# Patient Record
Sex: FEMALE
[De-identification: names, ages, dates, MRNs, and addresses within clinical notes are randomized per-mention and may not be internally consistent; named-entity substitution may affect disease eponyms.]

---

## 2022-12-21 ENCOUNTER — RX ONLY (OUTPATIENT)
Age: 22
Setting detail: RX ONLY
End: 2022-12-21

## 2022-12-21 RX ORDER — BIMATOPROST 0.3 MG/ML
SOLUTION/ DROPS OPHTHALMIC
Qty: 5 | Refills: 11 | Status: ERX | COMMUNITY
Start: 2022-12-21

## 2023-09-14 ENCOUNTER — RX ONLY (OUTPATIENT)
Age: 23
Setting detail: RX ONLY
End: 2023-09-14

## 2023-09-14 RX ORDER — CLOBETASOL PROPIONATE 0.5 MG/G
OINTMENT TOPICAL
Qty: 60 | Refills: 1 | Status: ERX | COMMUNITY
Start: 2023-09-13

## 2024-11-19 ENCOUNTER — RX ONLY (OUTPATIENT)
Age: 24
Setting detail: RX ONLY
End: 2024-11-19

## 2024-11-19 RX ORDER — CLINDAMYCIN PHOSPHATE 10 MG/ML
SOLUTION TOPICAL
Qty: 60 | Refills: 8 | Status: ERX | COMMUNITY
Start: 2024-11-19

## 2024-12-03 ENCOUNTER — RX ONLY (RX ONLY)
Age: 24
End: 2024-12-03

## 2024-12-03 RX ORDER — ROFLUMILAST 1.5 MG/G
CREAM TOPICAL
Qty: 60 | Refills: 3 | Status: ERX | COMMUNITY
Start: 2024-12-03

## 2024-12-30 ENCOUNTER — RX ONLY (RX ONLY)
Age: 24
End: 2024-12-30

## 2024-12-30 RX ORDER — PREDNISONE 10 MG/1
TABLET ORAL
Qty: 53 | Refills: 0 | Status: ERX | COMMUNITY
Start: 2024-12-29

## 2025-02-03 ENCOUNTER — APPOINTMENT (OUTPATIENT)
Dept: URBAN - METROPOLITAN AREA CLINIC 310 | Facility: CLINIC | Age: 25
Setting detail: DERMATOLOGY
End: 2025-02-03

## 2025-02-03 DIAGNOSIS — L20.89 OTHER ATOPIC DERMATITIS: ICD-10-CM

## 2025-02-03 PROCEDURE — ? OTC TREATMENT REGIMEN

## 2025-02-03 PROCEDURE — ? PRESCRIPTION MEDICATION MANAGEMENT

## 2025-02-03 PROCEDURE — ? MDM - TREATMENT GOALS

## 2025-02-03 PROCEDURE — ? ORDER FOR PATCH TESTING

## 2025-02-03 PROCEDURE — 99204 OFFICE O/P NEW MOD 45 MIN: CPT

## 2025-02-03 PROCEDURE — ? PRESCRIPTION

## 2025-02-03 PROCEDURE — ? COUNSELING

## 2025-02-03 RX ORDER — TRIAMCINOLONE ACETONIDE 1 MG/G
CREAM TOPICAL BID
Qty: 453.6 | Refills: 0 | Status: ERX | COMMUNITY
Start: 2025-02-03

## 2025-02-03 RX ADMIN — TRIAMCINOLONE ACETONIDE: 1 CREAM TOPICAL at 00:00

## 2025-02-03 ASSESSMENT — LOCATION DETAILED DESCRIPTION DERM
LOCATION DETAILED: RIGHT SUPERIOR LATERAL UPPER BACK
LOCATION DETAILED: LEFT PROXIMAL PRETIBIAL REGION
LOCATION DETAILED: RIGHT DISTAL PRETIBIAL REGION
LOCATION DETAILED: RIGHT POSTERIOR NECK
LOCATION DETAILED: LEFT SUPERIOR UPPER BACK

## 2025-02-03 ASSESSMENT — LOCATION ZONE DERM
LOCATION ZONE: LEG
LOCATION ZONE: TRUNK
LOCATION ZONE: NECK

## 2025-02-03 ASSESSMENT — LOCATION SIMPLE DESCRIPTION DERM
LOCATION SIMPLE: POSTERIOR NECK
LOCATION SIMPLE: LEFT PRETIBIAL REGION
LOCATION SIMPLE: RIGHT PRETIBIAL REGION
LOCATION SIMPLE: LEFT UPPER BACK
LOCATION SIMPLE: RIGHT BACK

## 2025-02-03 NOTE — PROCEDURE: PRESCRIPTION MEDICATION MANAGEMENT
Detail Level: Simple
Initiate Treatment: Triamcinolone 0.1% cream BID to affected areas on body until smooth. Discontinue use on back 3-5 days prior to patch testing
Plan: Discussed Dupixent if still flaring after patch testing
Render In Strict Bullet Format?: No
Discontinue Regimen: Clobetasol 0.05% cream

## 2025-02-03 NOTE — PROCEDURE: ORDER FOR PATCH TESTING
Patch Test To Be Applied: North American 80 Series
Detail Level: Zone
Counseling: I discussed the timing of the procedure and ensured the patient understands that this test requires multiple visits. No topical steroids applied should be applied to the patch testing location and no oral prednisone for two week prior to the test. While the patches are in place they should be kept dry which will limit bathing, swimming an exercise. I also explained that it is common for testing to be negative and this doesn't mean there isn't a allergic reaction occurring. During the testing itching is common.
Location Patches Should Be Applied: Back
Patch Test Reading Schedule: First Reading at 48 hours and Second Reading at 72 hours

## 2025-02-03 NOTE — HPI: RASH
What Type Of Note Output Would You Prefer (Optional)?: Bullet Format
Is This A New Presentation, Or A Follow-Up?: Rash
Additional History: Patient presents for evaluation of a rash. Describes itching, redness, and scale on her legs, arms, neck, back. Completed a 1-month prednisone course 7 days ago. Says the prednisone helped but that the rash started to flare again as the dose was decreased. Currently using clobetasol cream on the neck and legs. Uses Cetaphil body wash and lotion, Free & Clear detergent. Does not use fabric softeners or dryer sheets.\\n\\nPatient states she was previously on Humria for Rheumatoid Arthritis. Stopped about 1.5 years ago.

## 2025-02-24 ENCOUNTER — APPOINTMENT (OUTPATIENT)
Dept: URBAN - METROPOLITAN AREA CLINIC 310 | Facility: CLINIC | Age: 25
Setting detail: DERMATOLOGY
End: 2025-02-24

## 2025-02-24 DIAGNOSIS — L259 CONTACT DERMATITIS AND OTHER ECZEMA, UNSPECIFIED CAUSE: ICD-10-CM | Status: INADEQUATELY CONTROLLED

## 2025-02-24 PROBLEM — L23.9 ALLERGIC CONTACT DERMATITIS, UNSPECIFIED CAUSE: Status: ACTIVE | Noted: 2025-02-24

## 2025-02-24 PROCEDURE — 95044 PATCH/APPLICATION TESTS: CPT

## 2025-02-24 PROCEDURE — ? PATCH TESTING

## 2025-02-24 PROCEDURE — ? ADDITIONAL NOTES

## 2025-02-24 NOTE — PROCEDURE: PATCH TESTING
Post-Care Instructions: I reviewed with the patient in detail post-care instructions. Patient should not sweat, pick at, or get the patches wet for 72 hours.
Detail Level: None
Consent: Verbal consent obtained, risks reviewed including but not limited to rash, itching, allergic reaction, systemic rash, remote possibility of anaphylaxis to allergen.
Number Of Patches (Maximum Allowable Per Dos By Cms Is 90): 80

## 2025-02-24 NOTE — PROCEDURE: ADDITIONAL NOTES
Render Risk Assessment In Note?: no
Additional Notes: Allergens tested in the North American 80 series:\\nnickel sulfate hexahydrate\\nbalsam of peru (myroxylon pereirae resin)\\nfragrance mix\\nquaternium 15\\nneomycin sulfate\\nbudesonide\\ncobalt (II) chloride hexahydrate\\np-tert-butylphenol formaldehyde resin\\n4-phenylenediamine base\\npotassium dichromate\\ncarba mix\\nthiuram mix\\ndiazolidinyl urea (Germall® II)\\nparaben mix\\nblack rubber mix - PPD\\nimidazolidinyl urea (Germall® 115)\\nmercapto mix\\ntixocortol-21-pivalate\\n2-mercaptobenzothiazole\\ncolophony\\nbisphenol A epoxy resin\\nethylenediamine dihydrochloride\\namerchol L101\\nbenzocaine\\nbacitracin\\nDMDM hydantoin\\ncinchocaine-HCl\\nparthenolide\\n2-bromo-2-nitropropane-1,3-diol (bronopol™)\\nlidocaine-HCl\\ngold sodium thiosulfate\\nmethyldibromo glutaronitrile (MDBGN)\\ndisperse blue mix\\nhydrocortisone-17-butyrate\\nfragrance mix II\\niodopropynyl butylcarbamate\\nmixed dialkyl thioureas\\n2-hydroxyethyl methacrylate (HEMA)\\ndecyl glucoside\\nmethyl methacrylate\\ncinnamic aldehyde\\nethyl acrylate\\ntea tree oil, oxidized\\n4-chloro-3,5-xylenol (PCMX)\\npropolis\\n2-hydroxy-4-methoxybenzophenone (oxybenzone)\\ntosylamide formaldehyde resin\\nsesquiterpenelactone mix\\ncoconut diethanolamide (cocamide FREYA)\\n4-chloro-3-cresol (PCMC)\\nbenzalkonium chloride\\nbenzophenone-4\\ntriclosan\\nsorbic acid\\ncananga odorata (ylang ylang)\\ncompositae mix\\nethyleneurea, melamine formaldehyde mix\\nsorbitan sesquioleate\\n1,3-diphenylguanidine (DPG)\\ncetylstearylalcohol\\nglutaraldehyde\\ntriamcinolone acetonide\\nclobetasol-17-propionate\\ndl alpha tocopherol\\nethyl cyanoacrylate\\nphenoxyethanol\\ndisperse orange-3\\njasminum officinale oil (jasminum grandiflorum)\\nbutylhydroxytoluene (BHT)\\n2-ethylhexyl-4-methoxycinnamate (octinoxate)\\nbenzyl alcohol\\nformaldehyde\\nmethylchloroisothiazolinone/methylisothiazolinone\\nmethylisothiazolinone\\ncocamidopropyl betaine\\ndimethylaminopropylamine (DMAPA)\\noleamidopropyl dimethylamine\\npropylene glycol\\namidoamine (stearamidopropyl dimethylamine)\\nchlorhexidine digluconate\\n\\nA total of 80 patches of the extended series were applied.
Detail Level: Simple

## 2025-02-26 ENCOUNTER — APPOINTMENT (OUTPATIENT)
Dept: URBAN - METROPOLITAN AREA CLINIC 310 | Facility: CLINIC | Age: 25
Setting detail: DERMATOLOGY
End: 2025-02-26

## 2025-02-26 DIAGNOSIS — Z48.817 ENCOUNTER FOR SURGICAL AFTERCARE FOLLOWING SURGERY ON THE SKIN AND SUBCUTANEOUS TISSUE: ICD-10-CM | Status: STABLE

## 2025-02-26 PROCEDURE — ? PATCH TEST REMOVAL

## 2025-02-26 NOTE — PROCEDURE: PATCH TEST REMOVAL
Patch Test Removal Text: The patch test material was removed from the back today. The patch test reading will be performed 2/27/25
Detail Level: None

## 2025-02-27 ENCOUNTER — APPOINTMENT (OUTPATIENT)
Dept: URBAN - METROPOLITAN AREA CLINIC 310 | Facility: CLINIC | Age: 25
Setting detail: DERMATOLOGY
End: 2025-02-27

## 2025-02-27 DIAGNOSIS — L259 CONTACT DERMATITIS AND OTHER ECZEMA, UNSPECIFIED CAUSE: ICD-10-CM | Status: INADEQUATELY CONTROLLED

## 2025-02-27 PROBLEM — L23.9 ALLERGIC CONTACT DERMATITIS, UNSPECIFIED CAUSE: Status: ACTIVE | Noted: 2025-02-27

## 2025-02-27 PROCEDURE — ? NORTH AMERICAN 80 PATCH TEST READING

## 2025-02-27 PROCEDURE — ? COUNSELING

## 2025-02-27 PROCEDURE — 99213 OFFICE O/P EST LOW 20 MIN: CPT

## 2025-02-27 PROCEDURE — ? ADDITIONAL NOTES

## 2025-02-27 ASSESSMENT — LOCATION DETAILED DESCRIPTION DERM: LOCATION DETAILED: INFERIOR THORACIC SPINE

## 2025-02-27 ASSESSMENT — LOCATION ZONE DERM: LOCATION ZONE: TRUNK

## 2025-02-27 ASSESSMENT — LOCATION SIMPLE DESCRIPTION DERM: LOCATION SIMPLE: UPPER BACK

## 2025-02-27 NOTE — PROCEDURE: NORTH AMERICAN 80 PATCH TEST READING
Name Of Allergen 1: nickel sulfate hexahydrate
Name Of Allergen 22: ethylenediamine dihydrochloride
Name Of Allergen 2: balsam of peru (myroxylon pereirae resin)
Show Negative Results In The Note?: Yes
Allergen 8 Reaction: no reaction
Name Of Allergen 73: methylchloroisothiazolinone/methylisothiazolinone
Name Of Allergen 15: black rubber mix - PPD
Name Of Allergen 67: disperse orange-3
Name Of Allergen 58: sorbitan sesquioleate
Name Of Allergen 29: 2-bromo-2-nitropropane-1,3-diol (bronopol™)
Allergen 76 Reaction: 1+
Name Of Allergen 52: benzophenone-4
Name Of Allergen 60: cetylstearylalcohol
Allergen 29 Reaction: +/-
Name Of Allergen 62: triamcinolone acetonide
Name Of Allergen 7: cobalt (II) chloride hexahydrate
Name Of Allergen 48: sesquiterpenelactone mix
Name Of Allergen 5: neomycin sulfate
Name Of Allergen 16: imidazolidinyl urea (Germall® 115)
Name Of Allergen 42: ethyl acrylate
Name Of Allergen 54: sorbic acid
Name Of Allergen 10: potassium dichromate
Name Of Allergen 75: cocamidopropyl betaine
What Reading Time Point?: 72 hour
Name Of Allergen 44: 4-chloro-3,5-xylenol (PCMX)
Name Of Allergen 23: amerchol L101
Name Of Allergen 19: 2-mercaptobenzothiazole
Name Of Allergen 61: glutaraldehyde
Name Of Allergen 8: m-[id number]-[id number] formaldehyde resin
Name Of Allergen 37: mixed dialkyl thioureas
Name Of Allergen 38: 2-hydroxyethyl methacrylate (HEMA)
Name Of Allergen 34: hydrocortisone-17-butyrate
Name Of Allergen 74: methylisothiazolinone
Name Of Allergen 24: benzocaine
Name Of Allergen 46: 2-hydroxy-4-methoxybenzophenone (oxybenzone)
Name Of Allergen 11: carba mix
Name Of Allergen 56: compositae mix
Name Of Allergen 39: decyl glucoside
Name Of Allergen 45: propolis
Name Of Allergen 51: benzalkonium chloride
Name Of Allergen 3: fragrance mix
Name Of Allergen 6: budesonide
Name Of Allergen 33: disperse blue mix
Name Of Allergen 63: clobetasol-17-propionate
Name Of Allergen 14: paraben mix
Name Of Allergen 4: quaternium 15
Name Of Allergen 32: methyldibromo glutaronitrile (MDBGN)
Name Of Allergen 26: DMDM hydantoin
Name Of Allergen 41: cinnamic aldehyde
Name Of Allergen 28: partlizzetholide
Name Of Allergen 50: 4-chloro-3-cresol (PCMC)
Name Of Allergen 71: benzyl alcohol
Name Of Allergen 72: formaldehyde
Show Allergen Counseling In The Note?: No
Name Of Allergen 43: tea tree oil, oxidized
Name Of Allergen 35: fragrance mix II
Name Of Allergen 40: methyl methacrylate
Name Of Allergen 13: diazolidinyl urea (Germall® II)
Name Of Allergen 69: butylhydroxytoluene (BHT)
Number Of Patches Read: 80
Name Of Allergen 25: bacitracin
Name Of Allergen 77: oleamidopropyl dimethylamine
Name Of Allergen 21: bisphenol A epoxy resin
Detail Level: Zone
Name Of Allergen 18: tixocortol-21-pivalate
Name Of Allergen 36: iodopropynyl butylcarbamate
Name Of Allergen 57: ethyleneurea, melamine formaldehyde mix
Name Of Allergen 78: propylene glycol
Name Of Allergen 65: ethyl cyanoacrylate
Name Of Allergen 76: dimethylaminopropylamine (DMAPA)
Name Of Allergen 9: 4-phenylenediamine base
Name Of Allergen 55: cananga odorata (erika james)
Name Of Allergen 66: phenoxyethanol
Name Of Allergen 47: tosylamide formaldehyde resin
Name Of Allergen 68: jasminum officinale oil (jasminum grandiflorum)
Name Of Allergen 30: lidocaine-HCl
Name Of Allergen 12: thiuram mix
Name Of Allergen 31: gold sodium thiosulfate
Name Of Allergen 79: amidoamine (stearamidopropyl dimethylamine)
Name Of Allergen 20: colophony
Name Of Allergen 27: cinchocaine-HCl
Name Of Allergen 53: triclosan
Name Of Allergen 80: chlorhexidine digluconate
Name Of Allergen 70: 2-ethylhexyl-4-methoxycinnamate (octinoxate)
Name Of Allergen 17: mercapto mix
Name Of Allergen 59: 1,3-diphenylguanidine (DPG)
Name Of Allergen 49: coconut diethanolamide (cocamide FREYA)
Name Of Allergen 64: dl alpha tocopherol

## 2025-02-27 NOTE — PROCEDURE: ADDITIONAL NOTES
Render Risk Assessment In Note?: no
Additional Notes: [place] [facility] List sent to patient's preferred email address today
Detail Level: Zone